# Patient Record
Sex: FEMALE | Race: WHITE
[De-identification: names, ages, dates, MRNs, and addresses within clinical notes are randomized per-mention and may not be internally consistent; named-entity substitution may affect disease eponyms.]

---

## 2017-11-09 ENCOUNTER — HOSPITAL ENCOUNTER (EMERGENCY)
Dept: HOSPITAL 26 - MED | Age: 22
Discharge: HOME | End: 2017-11-09
Payer: COMMERCIAL

## 2017-11-09 VITALS — SYSTOLIC BLOOD PRESSURE: 104 MMHG | DIASTOLIC BLOOD PRESSURE: 67 MMHG

## 2017-11-09 VITALS — WEIGHT: 90.38 LBS | HEIGHT: 59 IN | BODY MASS INDEX: 18.22 KG/M2

## 2017-11-09 VITALS — SYSTOLIC BLOOD PRESSURE: 123 MMHG | DIASTOLIC BLOOD PRESSURE: 70 MMHG

## 2017-11-09 DIAGNOSIS — L72.3: Primary | ICD-10-CM

## 2017-11-09 NOTE — NUR
Patient discharged with v/s stable. Written and verbal after care instructions 
given and explained. 

Patient alert, oriented and verbalized understanding of instructions. 
Ambulatory with steady gait. All questions addressed prior to discharge. ID 
band removed. Patient advised to follow up with PMD. Rx of BACTRIM DS 
800MG-160MG TAB & DOXYCYCLINE 100MG CAP given. Patient educated on indication 
of medication including possible reaction and side effects. Opportunity to ask 
questions provided and answered.

## 2017-11-09 NOTE — NUR
-------------------------------------------------------------------------------

            *** Note undone in [facility] - 11/09/17 at 1716 by [facility] ***             

-------------------------------------------------------------------------------

PT AMBULATED TO BED 6.

## 2017-11-09 NOTE — NUR
22F BIB SELF C/O PAIN TO RT AXILLA, NON-RADIATING, 6/10 X YESTERDAY; PT STATES 
NO TRAUMA OR INJURY TO SITE AT THIS TIME; TWO "LUMPS" NOTED TO RT AXILLA ON 
PALPATION, NO ERYTHEMA TO SITE, NO OPEN SKIN NOTED TO SITES AT THIS TIME; PT 
STATES " YOU CAN'T SEE IT, BUT YOU CAN FEEL THE LUMPS"; PT AA&OX4, PERRLA, BL 
LUNG SOUNDS CLEAR, RR EVEN/UNLABORED, SKIN IS WARM/DRY/INTACT AT THIS TIME; 
STEADY GAIT; PT RESTING IN BED WITH HOB ELEVATED AND IN LOWEST POSITION; 
POSITIONED FOR COMFORT; ER MD MADE AWARE OF STATUS. WILL CONTINUE TO MONITOR.

## 2018-06-16 ENCOUNTER — HOSPITAL ENCOUNTER (EMERGENCY)
Dept: HOSPITAL 26 - MED | Age: 23
Discharge: HOME | End: 2018-06-16
Payer: COMMERCIAL

## 2018-06-16 VITALS — HEIGHT: 57 IN | BODY MASS INDEX: 22.87 KG/M2 | WEIGHT: 106 LBS

## 2018-06-16 VITALS — SYSTOLIC BLOOD PRESSURE: 110 MMHG | DIASTOLIC BLOOD PRESSURE: 63 MMHG

## 2018-06-16 VITALS — SYSTOLIC BLOOD PRESSURE: 98 MMHG | DIASTOLIC BLOOD PRESSURE: 65 MMHG

## 2018-06-16 DIAGNOSIS — E83.51: ICD-10-CM
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
## 2018-06-16 DIAGNOSIS — Z3A.28: ICD-10-CM
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
## 2018-06-16 DIAGNOSIS — O9A.213: Primary | ICD-10-CM

## 2018-06-16 DIAGNOSIS — S86.911A: ICD-10-CM

## 2018-06-16 DIAGNOSIS — D64.9: ICD-10-CM

## 2018-06-16 DIAGNOSIS — O99.013: ICD-10-CM
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
## 2018-06-16 LAB
ANION GAP SERPL CALCULATED.3IONS-SCNC: 15.7 MMOL/L (ref 8–16)
BASOPHILS # BLD AUTO: 0 K/UL (ref 0–0.22)
BASOPHILS NFR BLD AUTO: 0.3 % (ref 0–2)
BUN SERPL-MCNC: 6 MG/DL (ref 7–18)
CHLORIDE SERPL-SCNC: 103 MMOL/L (ref 98–107)
CO2 SERPL-SCNC: 24.3 MMOL/L (ref 21–32)
CREAT SERPL-MCNC: 0.5 MG/DL (ref 0.6–1.3)
EOSINOPHIL # BLD AUTO: 0.1 K/UL (ref 0–0.4)
EOSINOPHIL NFR BLD AUTO: 0.7 % (ref 0–4)
ERYTHROCYTE [DISTWIDTH] IN BLOOD BY AUTOMATED COUNT: 12.9 % (ref 11.6–13.7)
GFR SERPL CREATININE-BSD FRML MDRD: 193 ML/MIN (ref 90–?)
GLUCOSE SERPL-MCNC: 88 MG/DL (ref 74–106)
HCT VFR BLD AUTO: 25.4 % (ref 36–48)
HGB BLD-MCNC: 8.7 G/DL (ref 12–16)
LYMPHOCYTES # BLD AUTO: 1.5 K/UL (ref 2.5–16.5)
LYMPHOCYTES NFR BLD AUTO: 16.1 % (ref 20.5–51.1)
MCH RBC QN AUTO: 29 PG (ref 27–31)
MCHC RBC AUTO-ENTMCNC: 34 G/DL (ref 33–37)
MCV RBC AUTO: 85.4 FL (ref 80–94)
MONOCYTES # BLD AUTO: 0.7 K/UL (ref 0.8–1)
MONOCYTES NFR BLD AUTO: 7.2 % (ref 1.7–9.3)
NEUTROPHILS # BLD AUTO: 7.1 K/UL (ref 1.8–7.7)
NEUTROPHILS NFR BLD AUTO: 75.7 % (ref 42.2–75.2)
PLATELET # BLD AUTO: 199 K/UL (ref 140–450)
POTASSIUM SERPL-SCNC: 4 MMOL/L (ref 3.5–5.1)
PROTHROMBIN TIME: 9.9 SECS (ref 10.8–13.4)
RBC # BLD AUTO: 2.98 MIL/UL (ref 4.2–5.4)
SODIUM SERPL-SCNC: 139 MMOL/L (ref 136–145)
WBC # BLD AUTO: 9.3 K/UL (ref 4.8–10.8)

## 2018-07-29 ENCOUNTER — HOSPITAL ENCOUNTER (OUTPATIENT)
Dept: HOSPITAL 26 - MLD | Age: 23
Setting detail: OBSERVATION
LOS: 1 days | Discharge: HOME | End: 2018-07-30
Attending: OBSTETRICS & GYNECOLOGY | Admitting: OBSTETRICS & GYNECOLOGY
Payer: COMMERCIAL

## 2018-07-29 VITALS — WEIGHT: 117 LBS | HEIGHT: 58 IN | BODY MASS INDEX: 24.56 KG/M2

## 2018-07-29 VITALS — SYSTOLIC BLOOD PRESSURE: 107 MMHG | DIASTOLIC BLOOD PRESSURE: 68 MMHG

## 2018-07-29 DIAGNOSIS — O46.93: Primary | ICD-10-CM

## 2018-07-29 DIAGNOSIS — Z3A.33: ICD-10-CM

## 2018-07-29 PROCEDURE — 81000 URINALYSIS NONAUTO W/SCOPE: CPT

## 2018-07-29 PROCEDURE — 76805 OB US >/= 14 WKS SNGL FETUS: CPT

## 2018-07-29 PROCEDURE — G0378 HOSPITAL OBSERVATION PER HR: HCPCS
